# Patient Record
Sex: FEMALE | Race: OTHER
[De-identification: names, ages, dates, MRNs, and addresses within clinical notes are randomized per-mention and may not be internally consistent; named-entity substitution may affect disease eponyms.]

---

## 2018-12-26 ENCOUNTER — HOSPITAL ENCOUNTER (EMERGENCY)
Dept: HOSPITAL 54 - ER | Age: 23
Discharge: HOME | End: 2018-12-26
Payer: SELF-PAY

## 2018-12-26 VITALS
DIASTOLIC BLOOD PRESSURE: 88 MMHG | SYSTOLIC BLOOD PRESSURE: 141 MMHG | BODY MASS INDEX: 20.31 KG/M2 | WEIGHT: 134 LBS | HEIGHT: 68 IN

## 2018-12-26 DIAGNOSIS — S93.492A: Primary | ICD-10-CM
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
## 2018-12-26 DIAGNOSIS — Z60.2: ICD-10-CM
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
## 2018-12-26 PROCEDURE — Z7610: HCPCS

## 2018-12-26 PROCEDURE — A4606 OXYGEN PROBE USED W OXIMETER: HCPCS

## 2018-12-26 SDOH — SOCIAL STABILITY - SOCIAL INSECURITY: PROBLEMS RELATED TO LIVING ALONE: Z60.2

## 2022-05-11 ENCOUNTER — OFFICE (OUTPATIENT)
Dept: URBAN - METROPOLITAN AREA CLINIC 67 | Facility: CLINIC | Age: 27
End: 2022-05-11

## 2022-05-11 VITALS
WEIGHT: 146 LBS | DIASTOLIC BLOOD PRESSURE: 80 MMHG | TEMPERATURE: 98.4 F | SYSTOLIC BLOOD PRESSURE: 110 MMHG | HEIGHT: 68 IN

## 2022-05-11 DIAGNOSIS — K62.5 RECTAL BLEEDING: ICD-10-CM

## 2022-05-11 PROCEDURE — 99204 OFFICE O/P NEW MOD 45 MIN: CPT | Performed by: INTERNAL MEDICINE

## 2022-05-11 NOTE — SERVICEHPINOTES
This is a very pleasant female who comes in for evaluation.  She has had occasional blood per rectum.  She did have some rectal discomfort.  This happened a few weeks ago.  This was blood mixed with stool and on toilet paper.  Currently she is doing asymptomatic.  She does have a history of childhood asthma which is improved at this time.

## 2022-06-21 ENCOUNTER — AMBULATORY SURGICAL CENTER (OUTPATIENT)
Dept: URBAN - METROPOLITAN AREA SURGERY 42 | Facility: SURGERY | Age: 27
End: 2022-06-21

## 2022-06-21 VITALS
OXYGEN SATURATION: 100 % | DIASTOLIC BLOOD PRESSURE: 70 MMHG | HEART RATE: 69 BPM | HEIGHT: 68 IN | SYSTOLIC BLOOD PRESSURE: 120 MMHG | RESPIRATION RATE: 16 BRPM | TEMPERATURE: 97.5 F

## 2022-06-21 DIAGNOSIS — K62.5 HEMORRHAGE OF ANUS AND RECTUM: ICD-10-CM

## 2022-06-21 PROCEDURE — 45380 COLONOSCOPY AND BIOPSY: CPT | Performed by: INTERNAL MEDICINE
